# Patient Record
Sex: MALE | Race: WHITE | HISPANIC OR LATINO | URBAN - METROPOLITAN AREA
[De-identification: names, ages, dates, MRNs, and addresses within clinical notes are randomized per-mention and may not be internally consistent; named-entity substitution may affect disease eponyms.]

---

## 2020-02-05 ENCOUNTER — EMERGENCY (EMERGENCY)
Facility: HOSPITAL | Age: 20
LOS: 1 days | Discharge: HOME | End: 2020-02-05
Attending: EMERGENCY MEDICINE
Payer: COMMERCIAL

## 2020-02-05 VITALS
HEART RATE: 68 BPM | DIASTOLIC BLOOD PRESSURE: 69 MMHG | WEIGHT: 115.08 LBS | SYSTOLIC BLOOD PRESSURE: 112 MMHG | RESPIRATION RATE: 18 BRPM | OXYGEN SATURATION: 97 % | TEMPERATURE: 98 F | HEIGHT: 64 IN

## 2020-02-05 PROCEDURE — 99284 EMERGENCY DEPT VISIT MOD MDM: CPT

## 2020-02-05 RX ORDER — METOCLOPRAMIDE HCL 10 MG
5 TABLET ORAL ONCE
Refills: 0 | Status: COMPLETED | OUTPATIENT
Start: 2020-02-05 | End: 2020-02-05

## 2020-02-05 RX ORDER — IBUPROFEN 200 MG
600 TABLET ORAL ONCE
Refills: 0 | Status: COMPLETED | OUTPATIENT
Start: 2020-02-05 | End: 2020-02-05

## 2020-02-05 RX ORDER — LIDOCAINE 4 G/100G
1 CREAM TOPICAL ONCE
Refills: 0 | Status: COMPLETED | OUTPATIENT
Start: 2020-02-05 | End: 2020-02-05

## 2020-02-05 NOTE — ED ADULT NURSE NOTE - OBJECTIVE STATEMENT
Patient is a 19y male presenting to the ED ambulatory from home with c/o headache. Patient A&Ox4. Patient reports a intermittent headache starting Monday night while in class predominantly in the top of the head. States the headache has worsened to 8/10 and moved to bilateral temporal regions, the base of the skull and the middle of the neck. Reports the headache is accompanied by intermittent nausea, denies vomiting. States having "pressure" in the head and neck currently and pain in the sinuses. States taking 1g of acetaminophen at 7pm today with no relief. Reports having a "walking pneumonia" recently, received antibiotic treatment which finished approximately 1 week ago. Patient is a 19y male presenting to the ED ambulatory from home with c/o headache. Patient A&Ox4. Patient reports a intermittent headache starting Monday night while in class predominantly in the top of the head. States the headache has worsened to 8/10 and moved to bilateral temporal regions, the base of the skull and the middle of the neck. Reports the headache is accompanied by intermittent nausea, denies vomiting. States having "pressure" in the head and neck currently and pain in the sinuses. States taking 1g of acetaminophen at 7pm today with no relief. Reports having a "walking pneumonia" recently, received antibiotic treatment which finished approximately 1 week ago. Patient states he went to [facility] ED on Monday due to similar symptoms, a CT scan of the head and blood labs were drawn which all came back "normal." States the pain has continued with no relief. Denies chest pain, SOB, abdominal pain, fever/chills, burning upon urination or difficulty urinating, hematuria.

## 2020-02-06 VITALS
TEMPERATURE: 98 F | HEART RATE: 72 BPM | DIASTOLIC BLOOD PRESSURE: 76 MMHG | RESPIRATION RATE: 18 BRPM | SYSTOLIC BLOOD PRESSURE: 120 MMHG | OXYGEN SATURATION: 99 %

## 2020-02-06 PROCEDURE — 96374 THER/PROPH/DIAG INJ IV PUSH: CPT | Mod: XU

## 2020-02-06 PROCEDURE — 96375 TX/PRO/DX INJ NEW DRUG ADDON: CPT | Mod: XU

## 2020-02-06 PROCEDURE — 70496 CT ANGIOGRAPHY HEAD: CPT

## 2020-02-06 PROCEDURE — 70496 CT ANGIOGRAPHY HEAD: CPT | Mod: 26

## 2020-02-06 PROCEDURE — 99284 EMERGENCY DEPT VISIT MOD MDM: CPT | Mod: 25

## 2020-02-06 RX ORDER — METOCLOPRAMIDE HCL 10 MG
10 TABLET ORAL ONCE
Refills: 0 | Status: COMPLETED | OUTPATIENT
Start: 2020-02-06 | End: 2020-02-06

## 2020-02-06 RX ORDER — SODIUM CHLORIDE 9 MG/ML
1000 INJECTION INTRAMUSCULAR; INTRAVENOUS; SUBCUTANEOUS ONCE
Refills: 0 | Status: COMPLETED | OUTPATIENT
Start: 2020-02-06 | End: 2020-02-06

## 2020-02-06 RX ORDER — ONDANSETRON 8 MG/1
1 TABLET, FILM COATED ORAL
Qty: 10 | Refills: 0
Start: 2020-02-06

## 2020-02-06 RX ORDER — ONDANSETRON 8 MG/1
4 TABLET, FILM COATED ORAL ONCE
Refills: 0 | Status: COMPLETED | OUTPATIENT
Start: 2020-02-06 | End: 2020-02-06

## 2020-02-06 RX ORDER — MAGNESIUM SULFATE 500 MG/ML
1 VIAL (ML) INJECTION ONCE
Refills: 0 | Status: COMPLETED | OUTPATIENT
Start: 2020-02-06 | End: 2020-02-06

## 2020-02-06 RX ADMIN — ONDANSETRON 4 MILLIGRAM(S): 8 TABLET, FILM COATED ORAL at 05:53

## 2020-02-06 RX ADMIN — SODIUM CHLORIDE 1000 MILLILITER(S): 9 INJECTION INTRAMUSCULAR; INTRAVENOUS; SUBCUTANEOUS at 01:45

## 2020-02-06 RX ADMIN — LIDOCAINE 1 PATCH: 4 CREAM TOPICAL at 00:06

## 2020-02-06 RX ADMIN — Medication 10 MILLIGRAM(S): at 01:44

## 2020-02-06 RX ADMIN — Medication 100 GRAM(S): at 02:15

## 2020-02-06 RX ADMIN — Medication 600 MILLIGRAM(S): at 00:06

## 2020-02-06 RX ADMIN — Medication 5 MILLIGRAM(S): at 00:20

## 2020-02-06 NOTE — ED PROVIDER NOTE - CLINICAL SUMMARY MEDICAL DECISION MAKING FREE TEXT BOX
Healthy 18 y/o M with HA similar to prior HA. Likely tension HA 2/2 stress with neck strain sx. Well appearing, no focal deficits. Pain control and reassess.

## 2020-02-06 NOTE — ED PROVIDER NOTE - OBJECTIVE STATEMENT
18 y/o M with FHx of father being recently dx with 4 aneurysms presents to the ED c/o intermittent HA x2 days with a severity of 8/10. Pt states HA began on top of head which then moved to the temporal and radiated to neck. Feels pressure on head when HA subsides. Pt was recently dx with walking PNA and was prescribed zithromycin x1 week. Pt took 1g of Tylenol around 7pm tonight. Last HA episode was at 9pm. +nausea, Noise sensitive, and slight cough. Denies fever, nasal congestion, rhinorrhea, and light sensitivity

## 2020-02-06 NOTE — ED PROVIDER NOTE - PHYSICAL EXAMINATION
GENERAL: no acute distress, non-toxic appearing, AAOx3  HEAD: normocephalic, atraumatic  HEENT: normal conjunctiva, oral mucosa moist, neck supple  CARDIAC: regular rate and rhythm, normal S1 and S2, no appreciable murmurs  PULM: normal breath sounds, clear to ascultation bilaterally, no rales, rhonchi, or wheezing  GI: abdomen nondistended, soft, nontender, no guarding or rebound tenderness  : no CVA tenderness b/l, no suprapubic tenderness  NEURO:  No meningeal signs. no focal motor or sensory deficits, CN2-12 intact, normal speech, PERRLA, EOMI, normal gait  MSK:  Paraspinal tenderness. No midline tenderness. Full ROM.  SKIN: well-perfused, extremities warm, no visible rashes  PSYCH: appropriate mood and affect

## 2020-02-06 NOTE — ED PROVIDER NOTE - NSFOLLOWUPINSTRUCTIONS_ED_ALL_ED_FT
Your CT did not show any dangerous conditions.    Take ibuprofen or acetaminophen as needed for any left over pain    If your headache comes back very severe, if you have weakness or numbness of your arms or legs, fever, persistent vomiting, or any other major concern, return to the ED right away for further treatment

## 2020-02-06 NOTE — ED PROVIDER NOTE - NSFOLLOWUPCLINICS_GEN_ALL_ED_FT
[facility]  Neurology  [address] [place] [address] [address] [place] [address]  Phone: [phone]  Fax:   Follow Up Time: 7-10 Days

## 2020-02-06 NOTE — ED PROVIDER NOTE - PATIENT PORTAL LINK FT
You can access the FollowMyHealth Patient Portal offered by [facility] by registering at the following website: [email or web address]. By joining [employer]’s FollowMyHealth portal, you will also be able to view your health information using other applications (apps) compatible with our system.

## 2020-02-06 NOTE — ED PROVIDER NOTE - PROGRESS NOTE DETAILS
pt was signed out to me pending IV meds for symptomatic relief of headache- pt reexamined and continued to have headache despite tylenol, reglan, mg, lidocaine patch- reports pain sharp, coming and going, band-like distribution across the forehead, reports pain currently 3-4/10, still appears very anxious, attempted 02 support given potential for cluster, then re-evaluated by Dr. Varghese, still appeared uncomfortable and endorsing nausea, given persistence of symptoms, family hx of aneurysm, recent additional ED visit, CTa head now pending  Megan Nogueira, PGY-3 EM

## 2020-02-06 NOTE — ED PROVIDER NOTE - ATTENDING CONTRIBUTION TO CARE
on reassessment patient had a ct head and labs nl 2 days ago at outside hospital I saw the results on his phone with continual headache, iv meds given, reassess, signed out.
